# Patient Record
Sex: FEMALE | Race: WHITE | NOT HISPANIC OR LATINO | URBAN - METROPOLITAN AREA
[De-identification: names, ages, dates, MRNs, and addresses within clinical notes are randomized per-mention and may not be internally consistent; named-entity substitution may affect disease eponyms.]

---

## 2017-02-06 ENCOUNTER — EMERGENCY (EMERGENCY)
Facility: HOSPITAL | Age: 82
LOS: 1 days | Discharge: HOME | End: 2017-02-06
Attending: EMERGENCY MEDICINE
Payer: MEDICARE

## 2017-02-06 VITALS
HEART RATE: 70 BPM | WEIGHT: 128.09 LBS | RESPIRATION RATE: 18 BRPM | HEIGHT: 59 IN | OXYGEN SATURATION: 98 % | TEMPERATURE: 98 F | SYSTOLIC BLOOD PRESSURE: 144 MMHG | DIASTOLIC BLOOD PRESSURE: 77 MMHG

## 2017-02-06 DIAGNOSIS — R07.2 PRECORDIAL PAIN: ICD-10-CM

## 2017-02-06 LAB
HCT VFR BLD CALC: 36.7 % — LOW (ref 37–47)
HGB BLD-MCNC: 11.9 G/DL — LOW (ref 12–16)
MCHC RBC-ENTMCNC: 28.8 PG — SIGNIFICANT CHANGE UP (ref 27–31)
MCHC RBC-ENTMCNC: 32.4 G/DL — SIGNIFICANT CHANGE UP (ref 32–36)
MCV RBC AUTO: 88.9 FL — SIGNIFICANT CHANGE UP (ref 81–99)
PLATELET # BLD AUTO: 246 K/UL — SIGNIFICANT CHANGE UP (ref 150–400)
RBC # BLD: 4.13 M/UL — LOW (ref 4.4–5.2)
RBC # FLD: 13.4 % — SIGNIFICANT CHANGE UP (ref 11–15.6)
WBC # BLD: 3.72 K/UL — LOW (ref 4.8–10.8)
WBC # FLD AUTO: 3.72 K/UL — LOW (ref 4.8–10.8)

## 2017-02-06 PROCEDURE — 93010 ELECTROCARDIOGRAM REPORT: CPT

## 2017-02-06 PROCEDURE — 99285 EMERGENCY DEPT VISIT HI MDM: CPT

## 2017-02-06 NOTE — ED ADULT TRIAGE NOTE - CHIEF COMPLAINT QUOTE
intermittent cp started around 9pm  pt id from [facility] hx dementia ao x 1. pt able to state name  states pain is gone at present

## 2017-02-07 VITALS
SYSTOLIC BLOOD PRESSURE: 128 MMHG | DIASTOLIC BLOOD PRESSURE: 74 MMHG | TEMPERATURE: 98 F | OXYGEN SATURATION: 99 % | RESPIRATION RATE: 18 BRPM | HEART RATE: 72 BPM

## 2017-02-07 LAB
ALBUMIN SERPL ELPH-MCNC: 4.3 G/DL — SIGNIFICANT CHANGE UP (ref 3.3–5.2)
ALP SERPL-CCNC: 58 U/L — SIGNIFICANT CHANGE UP (ref 40–120)
ALT FLD-CCNC: 13 U/L — SIGNIFICANT CHANGE UP
ANION GAP SERPL CALC-SCNC: 13 MMOL/L — SIGNIFICANT CHANGE UP (ref 5–17)
APTT BLD: 27.4 SEC — LOW (ref 27.5–37.4)
AST SERPL-CCNC: 16 U/L — SIGNIFICANT CHANGE UP
BILIRUB SERPL-MCNC: 0.2 MG/DL — LOW (ref 0.4–2)
BUN SERPL-MCNC: 19 MG/DL — SIGNIFICANT CHANGE UP (ref 8–20)
CALCIUM SERPL-MCNC: 9.1 MG/DL — SIGNIFICANT CHANGE UP (ref 8.6–10.2)
CHLORIDE SERPL-SCNC: 103 MMOL/L — SIGNIFICANT CHANGE UP (ref 98–107)
CK SERPL-CCNC: 50 U/L — SIGNIFICANT CHANGE UP (ref 25–170)
CO2 SERPL-SCNC: 27 MMOL/L — SIGNIFICANT CHANGE UP (ref 22–29)
CREAT SERPL-MCNC: 0.51 MG/DL — SIGNIFICANT CHANGE UP (ref 0.5–1.3)
GLUCOSE SERPL-MCNC: 108 MG/DL — SIGNIFICANT CHANGE UP (ref 70–115)
INR BLD: 1.13 RATIO — SIGNIFICANT CHANGE UP (ref 0.88–1.16)
POTASSIUM SERPL-MCNC: 3.8 MMOL/L — SIGNIFICANT CHANGE UP (ref 3.5–5.3)
POTASSIUM SERPL-SCNC: 3.8 MMOL/L — SIGNIFICANT CHANGE UP (ref 3.5–5.3)
PROT SERPL-MCNC: 7.2 G/DL — SIGNIFICANT CHANGE UP (ref 6.6–8.7)
PROTHROM AB SERPL-ACNC: 12.4 SEC — SIGNIFICANT CHANGE UP (ref 10–13.1)
SODIUM SERPL-SCNC: 143 MMOL/L — SIGNIFICANT CHANGE UP (ref 135–145)
TROPONIN T SERPL-MCNC: <0.01 NG/ML — SIGNIFICANT CHANGE UP (ref 0–0.06)
TROPONIN T SERPL-MCNC: <0.01 NG/ML — SIGNIFICANT CHANGE UP (ref 0–0.06)

## 2017-02-07 PROCEDURE — 85610 PROTHROMBIN TIME: CPT

## 2017-02-07 PROCEDURE — 80053 COMPREHEN METABOLIC PANEL: CPT

## 2017-02-07 PROCEDURE — 84484 ASSAY OF TROPONIN QUANT: CPT

## 2017-02-07 PROCEDURE — 85730 THROMBOPLASTIN TIME PARTIAL: CPT

## 2017-02-07 PROCEDURE — 82550 ASSAY OF CK (CPK): CPT

## 2017-02-07 PROCEDURE — 93005 ELECTROCARDIOGRAM TRACING: CPT

## 2017-02-07 PROCEDURE — 85027 COMPLETE CBC AUTOMATED: CPT

## 2017-02-07 PROCEDURE — 99284 EMERGENCY DEPT VISIT MOD MDM: CPT | Mod: 25

## 2017-02-07 PROCEDURE — 96374 THER/PROPH/DIAG INJ IV PUSH: CPT

## 2017-02-07 RX ORDER — DIPHENHYDRAMINE HCL 50 MG
25 CAPSULE ORAL ONCE
Qty: 0 | Refills: 0 | Status: COMPLETED | OUTPATIENT
Start: 2017-02-07 | End: 2017-02-07

## 2017-02-07 RX ORDER — ASPIRIN/CALCIUM CARB/MAGNESIUM 324 MG
325 TABLET ORAL ONCE
Qty: 0 | Refills: 0 | Status: COMPLETED | OUTPATIENT
Start: 2017-02-07 | End: 2017-02-07

## 2017-02-07 RX ADMIN — Medication 325 MILLIGRAM(S): at 02:46

## 2017-02-07 RX ADMIN — Medication 25 MILLIGRAM(S): at 04:40

## 2017-02-07 NOTE — ED ADULT NURSE REASSESSMENT NOTE - NS ED NURSE REASSESS COMMENT FT1
Pt asleep and arousable on stretcher, resp even and unlabored, color good, showing NSR on monitor, pt calm and cooperative, denies pain, offers no complaints, awaiting cardio consult and dispo, will continue to monitor

## 2017-02-07 NOTE — CONSULT NOTE ADULT - ASSESSMENT
Brief episode of CP. Now resolved.  Dementia.  Negative troponin x 2.  Normal appearing ECG.  Suggest D/C and out pt f/u as needed.

## 2017-02-07 NOTE — CONSULT NOTE ADULT - SUBJECTIVE AND OBJECTIVE BOX
85F with c/o chest pain for 10 minutes, now can not recall ever having CP.  Hx of dementia per son who is a Doctor.  Denies CP, palpitation, syncope.  Hx of similar complaint in the past with negative w/u.      Vital Signs Last 24 Hrs  T(C): 36.9, Max: 36.9 (02-07 @ 07:47)  T(F): 98.4, Max: 98.4 (02-07 @ 07:47)  HR: 77 (70 - 77)  BP: 160/72 (124/60 - 160/72)  BP(mean): --  RR: 18 (18 - 18)  SpO2: 98% (98% - 98%)    Daily Height in cm: 149.86 (06 Feb 2017 22:58)    Daily     I&O's Detail      PHYSICAL EXAM:  Appearance: Normal, well nourished		  Cardiovascular: Normal S1 S2, No JVD, No cardiac murmurs, No carotid bruits, No peripheral edema  Respiratory: Lungs clear to auscultation	  Gastrointestinal:  Soft, Non-tender, + BS, no bruits	  Skin: No rashes, No ecchymoses, No cyanosis  Neurologic: Grossly non-focal with full strength in all four extremities  Extremities: Normal range of motion, No clubbing, cyanosis or edema  Vascular: Peripheral pulses palpable 2+ bilaterally      INTERPRETATION OF TELEMETRY:    ECG: SR no acute st/t abn    LABS:                        11.9   3.72  )-----------( 246      ( 06 Feb 2017 23:50 )             36.7     06 Feb 2017 23:50    143    |  103    |  19.0   ----------------------------<  108    3.8     |  27.0   |  0.51     Ca    9.1        06 Feb 2017 23:50    TPro  7.2    /  Alb  4.3    /  TBili  0.2    /  DBili  x      /  AST  16     /  ALT  13     /  AlkPhos  58     06 Feb 2017 23:50    CARDIAC MARKERS ( 07 Feb 2017 04:50 )  x     / <0.01 ng/mL / x     / x     / x      CARDIAC MARKERS ( 06 Feb 2017 23:50 )  x     / <0.01 ng/mL / 50 U/L / x     / x          PT/INR - ( 06 Feb 2017 23:50 )   PT: 12.4 sec;   INR: 1.13 ratio         PTT - ( 06 Feb 2017 23:50 )  PTT:27.4 sec    I&O's Summary    BNP  RADIOLOGY & ADDITIONAL STUDIES:

## 2017-02-07 NOTE — ED PROVIDER NOTE - NS ED MD SCRIBE ATTENDING SCRIBE SECTIONS
REVIEW OF SYSTEMS/PHYSICAL EXAM/HISTORY OF PRESENT ILLNESS/PAST MEDICAL/SURGICAL/SOCIAL HISTORY/VITAL SIGNS( Pullset)/DISPOSITION/RESULTS

## 2017-02-07 NOTE — ED ADULT NURSE REASSESSMENT NOTE - NS ED NURSE REASSESS COMMENT FT1
Pt ambulating to bathroom multiple times without difficulty, offers no complaints at this time, denies pain, will continue to monitor

## 2017-02-07 NOTE — ED ADULT NURSE REASSESSMENT NOTE - NS ED NURSE REASSESS COMMENT FT1
Pt restless in bed, pt getting up multiple times, pacing around unit, pt with stable gait at this time, pt reoriented to surroundings, offers no complaints at this time, denies pain, MD Huddleston made aware of restlessness, pt medicated as per MD orders

## 2017-02-07 NOTE — ED ADULT NURSE REASSESSMENT NOTE - NS ED NURSE REASSESS COMMENT FT1
Patient recd this morning A/Ox2-confused at times, VSS, denies chest pain or sob.  Respirations are even and unlabored, lungs cta, +bowel x4 quads, abdomen soft, nontender/nondistended, skin w/d/i. Patient has no complaints at this time, ambulated patient to bathroom-gait steady, patient remains on cardiac monitor, patient in yellow gown for safety concerns, will continue to monitor for safety and comfort.

## 2017-02-07 NOTE — ED PROVIDER NOTE - OBJECTIVE STATEMENT
An 86 year old female pt with a hx of dementia, from [facility], presents to the ED c/o CP. The pt reports experiencing substernal CP that she describes as a pressure around 6 PM. The pt states that the pain lasted approx. 10 minutes, denies any radiation, SOB, fevers, or chills. Pt has not had this pain in the past and denies any cardiac hx. She denies ever receiving a stress test or cardiac catheterization. Pt denies any aggravating or relieving factors and reports that the pain has dissipated. No further complaints at this time.

## 2017-02-07 NOTE — ED PROVIDER NOTE - MEDICAL DECISION MAKING DETAILS
An 86 year old female pt presents to the ED c/o CP> Will check labs, EKG, CXR, and re-evaluate. Will contact cardiology in the AM.

## 2018-06-13 NOTE — ED PROVIDER NOTE - CARDIOVASCULAR [+], MLM
Try over the counter readers with +2.25 power to help with reading.   Possible posterior vitreous detachment (sudden onset large floater and/or flashing lights) discussed.  Use artificial tears up to 4 times daily both eyes.  (Refresh Tears, Systane Ultra/Balance, or Theratears).  Call in February 2019 for an appointment in June 2019 for Complete Exam.    Dr. Stevens [phone]  
CHEST PAIN

## 2018-12-26 NOTE — ED ADULT NURSE NOTE - EXTENSIONS OF SELF_ADULT
Arthritis    Asthma, unspecified asthma severity, unspecified whether complicated, unspecified whether persistent    Bradyarrhythmia    HLD (hyperlipidemia)    HTN (hypertension)    Obese    Vertigo Dentures

## 2019-02-20 ENCOUNTER — EMERGENCY (EMERGENCY)
Facility: HOSPITAL | Age: 84
LOS: 1 days | Discharge: HOME | End: 2019-02-20
Attending: STUDENT IN AN ORGANIZED HEALTH CARE EDUCATION/TRAINING PROGRAM
Payer: MEDICARE

## 2019-02-20 VITALS
HEART RATE: 84 BPM | TEMPERATURE: 98 F | SYSTOLIC BLOOD PRESSURE: 148 MMHG | DIASTOLIC BLOOD PRESSURE: 85 MMHG | OXYGEN SATURATION: 100 % | RESPIRATION RATE: 18 BRPM

## 2019-02-20 VITALS
HEIGHT: 59 IN | DIASTOLIC BLOOD PRESSURE: 62 MMHG | RESPIRATION RATE: 18 BRPM | WEIGHT: 138.01 LBS | HEART RATE: 88 BPM | TEMPERATURE: 99 F | SYSTOLIC BLOOD PRESSURE: 132 MMHG | OXYGEN SATURATION: 96 %

## 2019-02-20 LAB
ALBUMIN SERPL ELPH-MCNC: 4.2 G/DL — SIGNIFICANT CHANGE UP (ref 3.3–5.2)
ALP SERPL-CCNC: 55 U/L — SIGNIFICANT CHANGE UP (ref 40–120)
ALT FLD-CCNC: 9 U/L — SIGNIFICANT CHANGE UP
ANION GAP SERPL CALC-SCNC: 12 MMOL/L — SIGNIFICANT CHANGE UP (ref 5–17)
APPEARANCE UR: CLEAR — SIGNIFICANT CHANGE UP
AST SERPL-CCNC: 15 U/L — SIGNIFICANT CHANGE UP
BASOPHILS # BLD AUTO: 0 K/UL — SIGNIFICANT CHANGE UP (ref 0–0.2)
BASOPHILS NFR BLD AUTO: 0.3 % — SIGNIFICANT CHANGE UP (ref 0–2)
BILIRUB SERPL-MCNC: 0.4 MG/DL — SIGNIFICANT CHANGE UP (ref 0.4–2)
BILIRUB UR-MCNC: NEGATIVE — SIGNIFICANT CHANGE UP
BUN SERPL-MCNC: 19 MG/DL — SIGNIFICANT CHANGE UP (ref 8–20)
CALCIUM SERPL-MCNC: 9.2 MG/DL — SIGNIFICANT CHANGE UP (ref 8.6–10.2)
CHLORIDE SERPL-SCNC: 104 MMOL/L — SIGNIFICANT CHANGE UP (ref 98–107)
CO2 SERPL-SCNC: 27 MMOL/L — SIGNIFICANT CHANGE UP (ref 22–29)
COLOR SPEC: YELLOW — SIGNIFICANT CHANGE UP
CREAT SERPL-MCNC: 0.7 MG/DL — SIGNIFICANT CHANGE UP (ref 0.5–1.3)
DIFF PNL FLD: ABNORMAL
EOSINOPHIL # BLD AUTO: 0.1 K/UL — SIGNIFICANT CHANGE UP (ref 0–0.5)
EOSINOPHIL NFR BLD AUTO: 2.4 % — SIGNIFICANT CHANGE UP (ref 0–6)
FLUAV H3 RNA SPEC QL NAA+PROBE: DETECTED
GLUCOSE SERPL-MCNC: 127 MG/DL — HIGH (ref 70–115)
GLUCOSE UR QL: NEGATIVE MG/DL — SIGNIFICANT CHANGE UP
HCT VFR BLD CALC: 38.3 % — SIGNIFICANT CHANGE UP (ref 37–47)
HGB BLD-MCNC: 12.4 G/DL — SIGNIFICANT CHANGE UP (ref 12–16)
KETONES UR-MCNC: NEGATIVE — SIGNIFICANT CHANGE UP
LACTATE BLDV-MCNC: 0.8 MMOL/L — SIGNIFICANT CHANGE UP (ref 0.5–2)
LEUKOCYTE ESTERASE UR-ACNC: ABNORMAL
LIDOCAIN IGE QN: 38 U/L — SIGNIFICANT CHANGE UP (ref 22–51)
LYMPHOCYTES # BLD AUTO: 0.8 K/UL — LOW (ref 1–4.8)
LYMPHOCYTES # BLD AUTO: 22.5 % — SIGNIFICANT CHANGE UP (ref 20–55)
MCHC RBC-ENTMCNC: 28.7 PG — SIGNIFICANT CHANGE UP (ref 27–31)
MCHC RBC-ENTMCNC: 32.4 G/DL — SIGNIFICANT CHANGE UP (ref 32–36)
MCV RBC AUTO: 88.7 FL — SIGNIFICANT CHANGE UP (ref 81–99)
MONOCYTES # BLD AUTO: 0.5 K/UL — SIGNIFICANT CHANGE UP (ref 0–0.8)
MONOCYTES NFR BLD AUTO: 13.5 % — HIGH (ref 3–10)
NEUTROPHILS # BLD AUTO: 2.3 K/UL — SIGNIFICANT CHANGE UP (ref 1.8–8)
NEUTROPHILS NFR BLD AUTO: 61.3 % — SIGNIFICANT CHANGE UP (ref 37–73)
NITRITE UR-MCNC: NEGATIVE — SIGNIFICANT CHANGE UP
PH UR: 6.5 — SIGNIFICANT CHANGE UP (ref 5–8)
PLATELET # BLD AUTO: 244 K/UL — SIGNIFICANT CHANGE UP (ref 150–400)
POTASSIUM SERPL-MCNC: 3.7 MMOL/L — SIGNIFICANT CHANGE UP (ref 3.5–5.3)
POTASSIUM SERPL-SCNC: 3.7 MMOL/L — SIGNIFICANT CHANGE UP (ref 3.5–5.3)
PROT SERPL-MCNC: 7.1 G/DL — SIGNIFICANT CHANGE UP (ref 6.6–8.7)
PROT UR-MCNC: NEGATIVE MG/DL — SIGNIFICANT CHANGE UP
RAPID RVP RESULT: DETECTED
RBC # BLD: 4.32 M/UL — LOW (ref 4.4–5.2)
RBC # FLD: 13.8 % — SIGNIFICANT CHANGE UP (ref 11–15.6)
SODIUM SERPL-SCNC: 143 MMOL/L — SIGNIFICANT CHANGE UP (ref 135–145)
SP GR SPEC: 1.01 — SIGNIFICANT CHANGE UP (ref 1.01–1.02)
UROBILINOGEN FLD QL: NEGATIVE MG/DL — SIGNIFICANT CHANGE UP
WBC # BLD: 3.8 K/UL — LOW (ref 4.8–10.8)
WBC # FLD AUTO: 3.8 K/UL — LOW (ref 4.8–10.8)

## 2019-02-20 PROCEDURE — 74177 CT ABD & PELVIS W/CONTRAST: CPT | Mod: 26

## 2019-02-20 PROCEDURE — 83690 ASSAY OF LIPASE: CPT

## 2019-02-20 PROCEDURE — 87633 RESP VIRUS 12-25 TARGETS: CPT

## 2019-02-20 PROCEDURE — 74177 CT ABD & PELVIS W/CONTRAST: CPT

## 2019-02-20 PROCEDURE — 71045 X-RAY EXAM CHEST 1 VIEW: CPT | Mod: 26

## 2019-02-20 PROCEDURE — 99284 EMERGENCY DEPT VISIT MOD MDM: CPT | Mod: 25

## 2019-02-20 PROCEDURE — 81001 URINALYSIS AUTO W/SCOPE: CPT

## 2019-02-20 PROCEDURE — 80053 COMPREHEN METABOLIC PANEL: CPT

## 2019-02-20 PROCEDURE — 83605 ASSAY OF LACTIC ACID: CPT

## 2019-02-20 PROCEDURE — 87798 DETECT AGENT NOS DNA AMP: CPT

## 2019-02-20 PROCEDURE — 71045 X-RAY EXAM CHEST 1 VIEW: CPT

## 2019-02-20 PROCEDURE — 87581 M.PNEUMON DNA AMP PROBE: CPT

## 2019-02-20 PROCEDURE — 87486 CHLMYD PNEUM DNA AMP PROBE: CPT

## 2019-02-20 PROCEDURE — 36415 COLL VENOUS BLD VENIPUNCTURE: CPT

## 2019-02-20 PROCEDURE — 85027 COMPLETE CBC AUTOMATED: CPT

## 2019-02-20 PROCEDURE — 99284 EMERGENCY DEPT VISIT MOD MDM: CPT

## 2019-02-20 RX ORDER — CEPHALEXIN 500 MG
1 CAPSULE ORAL
Qty: 20 | Refills: 0 | OUTPATIENT
Start: 2019-02-20 | End: 2019-03-01

## 2019-02-20 RX ORDER — ASPIRIN/CALCIUM CARB/MAGNESIUM 324 MG
1 TABLET ORAL
Qty: 15 | Refills: 0 | OUTPATIENT
Start: 2019-02-20 | End: 2019-03-06

## 2019-02-20 RX ORDER — SODIUM CHLORIDE 9 MG/ML
3 INJECTION INTRAMUSCULAR; INTRAVENOUS; SUBCUTANEOUS ONCE
Qty: 0 | Refills: 0 | Status: COMPLETED | OUTPATIENT
Start: 2019-02-20 | End: 2019-02-20

## 2019-02-20 RX ORDER — ATORVASTATIN CALCIUM 80 MG/1
1 TABLET, FILM COATED ORAL
Qty: 15 | Refills: 0 | OUTPATIENT
Start: 2019-02-20 | End: 2019-03-06

## 2019-02-20 RX ORDER — CEPHALEXIN 500 MG
500 CAPSULE ORAL ONCE
Qty: 0 | Refills: 0 | Status: COMPLETED | OUTPATIENT
Start: 2019-02-20 | End: 2019-02-20

## 2019-02-20 RX ORDER — SODIUM CHLORIDE 9 MG/ML
500 INJECTION INTRAMUSCULAR; INTRAVENOUS; SUBCUTANEOUS ONCE
Qty: 0 | Refills: 0 | Status: COMPLETED | OUTPATIENT
Start: 2019-02-20 | End: 2019-02-20

## 2019-02-20 RX ADMIN — SODIUM CHLORIDE 500 MILLILITER(S): 9 INJECTION INTRAMUSCULAR; INTRAVENOUS; SUBCUTANEOUS at 13:32

## 2019-02-20 RX ADMIN — SODIUM CHLORIDE 3 MILLILITER(S): 9 INJECTION INTRAMUSCULAR; INTRAVENOUS; SUBCUTANEOUS at 13:26

## 2019-02-20 RX ADMIN — Medication 500 MILLIGRAM(S): at 22:04

## 2019-02-20 RX ADMIN — Medication 75 MILLIGRAM(S): at 22:05

## 2019-02-20 NOTE — ED PROVIDER NOTE - PHYSICAL EXAMINATION
Constitutional - well-developed; well nourished. Head - NCAT. Airway patent. Eyes - PERRL. CV - RRR. no murmur. no edema. Pulm - CTAB. Abd - soft, nt. no rebound. no guarding. Neuro - A&Ox2. strength 5/5 x4. sensation intact x4. normal gait. Skin - No rash. MSK - normal ROM.

## 2019-02-20 NOTE — ED ADULT NURSE REASSESSMENT NOTE - NS ED NURSE REASSESS COMMENT FT1
Pt becoming increasingly disoriented. Pt assisted to bathroom and back to bed multiple times. Pt reoriented continuously. Pt assisted back to bed, educated on need to wait for CT scan to be preformed, pt currently in stretcher with siderails up. Pt given call bell, placed in pt hand, and educated to use call bell for needs.

## 2019-02-20 NOTE — ED ADULT TRIAGE NOTE - CHIEF COMPLAINT QUOTE
Pt sent in from the [facility] for abdominal pain, cough and lethargy for the last couple of days. Pt h/o dementia, not a great historian.

## 2019-02-20 NOTE — ED ADULT NURSE NOTE - ED STAT RN HANDOFF DETAILS
report given to ems, pt stable for transport to facility, baseline ms calm and cooperative @ this time

## 2019-02-20 NOTE — ED ADULT NURSE REASSESSMENT NOTE - NS ED NURSE REASSESS COMMENT FT1
Report received from offgoing RN, chart as noted, pt a&ox2 ambulating in ED with steady gait noted with 1:1 LYDIA Gomez in place. Pt reoriented to situation, pending RVP results per MD. No cough noted, denies pain denies n/v/d. Safety maintained, appears in no apparent distress @ this time

## 2019-02-20 NOTE — ED ADULT NURSE REASSESSMENT NOTE - NS ED NURSE REASSESS COMMENT FT1
pt ambulating around unit with [facility] amalia at her bedside. pt awaiting ct results at this time. pt educated on plan of care, pt able to successfully teach back plan of care to RN, RN will continue to reeducate pt during hospital stay.

## 2019-02-20 NOTE — ED PROVIDER NOTE - CLINICAL SUMMARY MEDICAL DECISION MAKING FREE TEXT BOX
Pt with cold symptoms for the past 48 h now complaining of abd pain.  will check labs, ct and reassess.

## 2019-02-20 NOTE — ED ADULT NURSE NOTE - OBJECTIVE STATEMENT
Assumed pt care 1325. Pt lying in bed at this time, aide at bedside, pt in no acute distress. Pt A+Ox2 pt disoriented to time. Pt states she has been feeling "sick" for the last few days describing cough, achiness. Pt states she also began having abdominal pain, described as generalized abdominal pain, nontender to palpation, nondistended, bowel sounds present all four quadrants. Pt lung sounds clear b/l, skin warm dry intact. Pt educated on plan of care, able to successfully teach back plan of care to RN. RN will continue to reeducate pt during hospital stay.

## 2019-02-20 NOTE — ED ADULT NURSE REASSESSMENT NOTE - NS ED NURSE REASSESS COMMENT FT1
Pt placed in yellow gown. Pt increasingly confused and insist ant on walking around ER. Pt aide left bedside, 1:1 ordered, 1:1 now in place. Pt given call bell and tv remote, pt safety maintained, pt now sitting comfortably in B7R. Pt placed in yellow gown. Pt increasingly confused and insist ant on walking around ER. Pt aide left bedside, 1:1 ordered, 1:1 now in place. Pt given call bell and tv remote, pt safety maintained, pt now sitting comfortably in B7R. Pt educated that we are awaiting results of RVP.

## 2019-02-20 NOTE — ED PROVIDER NOTE - PROGRESS NOTE DETAILS
Pt with dementia to get CT scan to for abdominal pain. Pt unable to consent for IV contrast.  IV contrast medically necessary for this patient so will forego consent at this time. Pt labs and imaging reviewed. Vascular recommending statin and baby ASA. [facility] requesting influenza swab before d/c. Pt signed out to DR. Worthington. SPoke with [facility] rep - gave results of flu+ and uti, also told of recs from vascular, SW arranging transportation back to [facility]

## 2019-02-20 NOTE — ED PROVIDER NOTE - OBJECTIVE STATEMENT
Pt is an 87 yo F co abdominal pain.  PMHx significant for dementia. Pt was sent from assisted living as she has been complaining of abdominal pain today.  EMS also reports NH stated patient has had cold/cough symptoms over the past several days which has been going around the assisted living. Pt admits to abdominal pain but denies any other complaints. Hx limited 2/2 dementia.

## 2019-02-20 NOTE — CONSULT NOTE ADULT - SUBJECTIVE AND OBJECTIVE BOX
VASCULAR SURGERY CONSULT    HPI: 88y Female hx Alzheimers dementia sent to ED by [facility] for malaise, aches and pains, cough and flu quarantine in the facility and no family to pick her up. Per nursing home no issues eating, eating full meals with no abdominal pain or food fear, and no weight loss. Normal BM no diarrhea or bloody BM. Patient denies fevers/chills, denies lightheadedness/dizziness, denies SOB/chest pain, denies nausea/vomiting, denies constipation/diarrhea.     ROS:   General: denies fatigue, unintended weight loss or night sweats  Neuro: denies focal weakness, numbness or tingling  CV: denies chest pain, chest pressure or palpitations  Resp: denies shortness of breath, pleuritic pain, cough, or wheezing  GI: denies changes in bowel movement, melena or BRBPR  : denies dysuria, hematuria, urinary frequency or urinary urgency  MSK: denies myalgias      PAST MEDICAL & SURGICAL HISTORY:  Alzheimers dementia  HTN  HLD  glaucoma  s/p hysterectomy    FAMILY HISTORY:    [x] Family history not pertinent as reviewed with the patient and family    SOCIAL HISTORY:  lives at nursing home. Had 3 children. No tobacco history    ALLERGIES: NKA No Known Allergies      HOME MEDICATIONS: reviewed      --------------------------------------------------------------------------------------------    PHYSICAL EXAM:  General: NAD, Lying in bed comfortably  Neuro: A+Ox3  HEENT: NC/AT, EOMI  Neck: Soft, supple  Cardio: RRR, nml S1/S2  Resp: Good effort, CTA b/l  GI/Abd: Soft, NT/ND, no rebound/guarding, no masses palpated  Vascular: All 4 extremities warm.  Skin: Intact, no breakdown  Lymphatic/Nodes: No palpable lymphadenopathy  Pelvis: stable  Musculoskeletal: All 4 extremities moving spontaneously, no limitations, no spinal tenderness or stepoffs  --------------------------------------------------------------------------------------------    LABS                 12.4   3.8    )----------(  244       ( 2019 13:38 )               38.3      143    |  104    |  19.0   ----------------------------<  127        ( 2019 13:38 )  3.7     |  27.0   |  0.70     Ca    9.2        ( 2019 13:38 )    TPro  7.1    /  Alb  4.2    /  TBili  0.4    /  DBili  x      /  AST  15     /  ALT  9      /  AlkPhos  55     ( 2019 13:38 )    LIVER FUNCTIONS - ( 2019 13:38 )  Alb: 4.2 g/dL / Pro: 7.1 g/dL / ALK PHOS: 55 U/L / ALT: 9 U/L / AST: 15 U/L / GGT: x             Urinalysis Basic - ( 2019 13:59 )    Color: Yellow / Appearance: Clear / S.010 / pH: x  Gluc: x / Ketone: Negative  / Bili: Negative / Urobili: Negative mg/dL   Blood: x / Protein: Negative mg/dL / Nitrite: Negative   Leuk Esterase: Moderate / RBC: 3-5 /HPF / WBC 3-5   Sq Epi: x / Non Sq Epi: Occasional / Bacteria: Occasional        17:53 - VBG - pH:       | pCO2:       | pO2:       | Lactate: 0.8      --------------------------------------------------------------------------------------------  IMAGING  CT A/P: There is 70 % narrowing of the superior mesenteric artery origin. Could   this patient experienced postprandial abdominal pain secondary to SMA   origin stenosis. There is degenerative spondylosis with multilevel disc   space narrowing and marginal osteophytes involving the lower thoracic and   lumbar vertebra.    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 De La Vega  - Plan discussed with Attending, Dr. De La Vega

## 2019-02-20 NOTE — ED ADULT NURSE REASSESSMENT NOTE - NS ED NURSE REASSESS COMMENT FT1
Pt assisted to bathroom, ambulated with steady gait.  Pt assisted back into bed. Pt given call bell, educated to use it for assistance.

## 2021-06-10 NOTE — ED ADULT NURSE NOTE - IN THE PAST 12 MONTHS HAVE YOU USED DRUGS OTHER THAN THOSE REQUIRED FOR MEDICAL REASON?
Debridement Text: The wound edges were debrided prior to proceeding with the closure to facilitate wound healing. No

## 2021-11-03 NOTE — ED ADULT NURSE NOTE - HOW OFTEN DO YOU HAVE A DRINK CONTAINING ALCOHOL?
Emergency Physician Note        Note Open Time: 3:29 AM EDT    Chief Complaint  Ankle Pain (Pt reports left ankle pain, tingling, numbness for last \"4 days\". Pt states that she had \"cast placed 4 days ago\". Pt alert and oriented and rates pain as a 7/10.)       History of Present Illness  Chely Bergeron is a 25 y.o. female who presents to the ED for ankle pain. Patient reports that she has had a longstanding issue with her left ankle and left lower extremity being treated by her orthopedist and 4 days ago they placed her in a cast to help with tendinitis. The cast however was formed with the toe pointed about 40 degrees from horizontal.  She states she is having significant pain in the ankle and wants to have the cast removed and be given a boot as well as referral to a new orthopedist.  She denies any other complaints. 10 systems reviewed, pertinent positives per HPI otherwise noted to be negative    I have reviewed the following from the nursing documentation:      Prior to Admission medications    Medication Sig Start Date End Date Taking? Authorizing Provider   meloxicam (MOBIC) 15 MG tablet Take 15 mg by mouth daily   Yes Historical Provider, MD   Riboflavin (B2) 100 MG TABS Take by mouth   Yes Historical Provider, MD   Ascorbic Acid (VITAMIN C) 250 MG tablet Take 250 mg by mouth daily   Yes Historical Provider, MD   SUMAtriptan (IMITREX) 25 MG tablet Take 25 mg by mouth once as needed for Migraine   Yes Historical Provider, MD   Diclofenac Sodium-Capsaicin 75 & 0.025 MG-% THPK by Combination route   Yes Historical Provider, MD   fluticasone (FLONASE) 50 MCG/ACT nasal spray 1 spray by Each Nare route daily   Yes Historical Provider, MD   gabapentin (NEURONTIN) 300 MG capsule Start with one cap once daily for 2 days, then one cap twice daily for 2 days.  Caution: may cause drowsiness 9/26/21 10/3/21  Sami Steen PA-C   metoprolol tartrate (LOPRESSOR) 25 MG tablet Take 1 tablet by mouth 2 times daily 12/12/18   Iker Burgos MD   cloNIDine (CATAPRES) 0.1 MG tablet Take 2-4 tablets by mouth nightly as needed for High Blood Pressure 12/12/18   Iker Burgos MD   ondansetron [facility]) 4 MG tablet Take 1 tablet by mouth every 8 hours as needed for Nausea or Vomiting 12/5/18   Bravo Rosario MD   sulindac (CLINORIL) 150 MG tablet Take 1 tablet by mouth 2 times daily for 20 doses Do not take with ibuprofen or other NSAIDs or anti-inflammatories 9/22/18 10/2/18  SANDRA Casey   levETIRAcetam (KEPPRA) 250 MG tablet Take 250 mg by mouth 2 times daily    Historical Provider, MD   vitamin B-6 (PYRIDOXINE) 50 MG tablet Take 50 mg by mouth daily    Historical Provider, MD   Polyethyl Glycol-Propyl Glycol (POLYETHYL GLYC-PROPYL GLYC PF OP) Apply to eye    Historical Provider, MD   Tretinoin (RETIN-A EX) Apply topically    Historical Provider, MD       Allergies as of 11/03/2021 - Fully Reviewed 11/03/2021   Allergen Reaction Noted    Compazine [prochlorperazine maleate]  12/01/2016    Reglan [metoclopramide]  09/22/2018       Past Medical History:   Diagnosis Date    Adam-Danlos syndrome     Headache     Scoliosis         Surgical History: History reviewed. No pertinent surgical history. Family History:  History reviewed. No pertinent family history.     Social History     Socioeconomic History    Marital status: Single     Spouse name: Not on file    Number of children: Not on file    Years of education: Not on file    Highest education level: Not on file   Occupational History    Not on file   Tobacco Use    Smoking status: Never Smoker    Smokeless tobacco: Never Used   Vaping Use    Vaping Use: Never used   Substance and Sexual Activity    Alcohol use: Yes     Comment: rarely    Drug use: Yes     Types: Marijuana [employer])     Comment: occassionally    Sexual activity: Yes     Partners: Female   Other Topics Concern    Not on file   Social History Narrative    Not on file     Social Determinants of Health     Financial Resource Strain:     Difficulty of Paying Living Expenses:    Food Insecurity:     Worried About Running Out of Food in the Last Year:     [address] in the Last Year:    Transportation Needs:     Lack of Transportation (Medical):  Lack of Transportation (Non-Medical):    Physical Activity:     Days of Exercise per Week:     Minutes of Exercise per Session:    Stress:     Feeling of Stress :    Social Connections:     Frequency of Communication with Friends and Family:     Frequency of Social Gatherings with Friends and Family:     Attends Zoroastrianism Services:     Active Member of Clubs or Organizations:     Attends Club or Organization Meetings:     Marital Status:    Intimate Partner Violence:     Fear of Current or Ex-Partner:     Emotionally Abused:     Physically Abused:     Sexually Abused:        Nursing notes reviewed. ED Triage Vitals [11/03/21 0206]   Enc Vitals Group      BP (!) 160/89      Pulse 120      Resp 17      Temp 98.1 °F (36.7 °C)      Temp Source Oral      SpO2 100 %      Weight 134 lb 7.7 oz (61 kg)      Height       Head Circumference       Peak Flow       Pain Score       Pain Loc       Pain Edu? Excl. in [address]? GENERAL:  Awake, alert. Well developed, well nourished with no apparent distress. HENT:  Normocephalic, Atraumatic, moist mucous membranes. EXTREMITIES:  Normal range of motion, no edema, no bony tenderness, no deformity, distal pulses present. Left ankle in a black fiberglass cast plantarflexed about 30 to 40 degrees. No swelling or warmth or erythema. SKIN: Warm, dry and intact. NEUROLOGIC: Normal mental status. Moving all extremities to command. MEDICAL DECISION MAKING   I removed the patient's cast using cast saw. We then placed the patient in boot. She is much more comfortable.   I am referring her to a new orthopedist per her request.     I advised the patient to return to the emergency department immediately for any new or worsening symptoms, such as increased pain or swelling. The patient voiced agreement and understanding of the treatment plan. No results found for this visit on 11/03/21. I estimate there is LOW risk for COMPARTMENT SYNDROME, DEEP VENOUS THROMBOSIS, SEPTIC ARTHRITIS, TENDON OR NEUROVASCULAR INJURY, thus I consider the discharge disposition reasonable. Ruben Caruso and I have discussed the diagnosis and risks, and we agree with discharging home to follow-up with their primary doctor or the referral orthopedist. We also discussed returning to the Emergency Department immediately if new or worsening symptoms occur. We have discussed the symptoms which are most concerning (e.g., changing or worsening pain, numbness, weakness) that necessitate immediate return. Final Impression    1. Chronic pain of left ankle        Blood pressure 114/63, pulse 85, temperature 98.1 °F (36.7 °C), temperature source Temporal, resp. rate 16, weight 134 lb 7.7 oz (61 kg), last menstrual period 10/01/2021, SpO2 99 %. Patient was given scripts for the following medications. I counseled patient how to take these medications. New Prescriptions    No medications on file       Disposition  Pt is in good condition upon Discharge to home. This chart was generated using the [address] [place] [address] dictation system. I created this record but it may contain dictation errors.           Jamaal Burgess MD  11/03/21 0039 Never

## 2023-06-23 NOTE — ED PROVIDER NOTE - CPE EDP ENMT NORM
normal... no verbal instruction/written material/individual instruction/pictorial/teach back - (Patient repeats in own words)/patient instructed

## 2025-01-01 ENCOUNTER — INPATIENT (INPATIENT)
Facility: HOSPITAL | Age: 89
LOS: 0 days | DRG: 872 | End: 2025-02-18
Attending: INTERNAL MEDICINE | Admitting: INTERNAL MEDICINE
Payer: MEDICARE

## 2025-01-01 VITALS
OXYGEN SATURATION: 98 % | SYSTOLIC BLOOD PRESSURE: 107 MMHG | RESPIRATION RATE: 30 BRPM | DIASTOLIC BLOOD PRESSURE: 62 MMHG | WEIGHT: 115.08 LBS | HEART RATE: 161 BPM

## 2025-01-01 VITALS — OXYGEN SATURATION: 97 %

## 2025-01-01 DIAGNOSIS — J96.01 ACUTE RESPIRATORY FAILURE WITH HYPOXIA: ICD-10-CM

## 2025-01-01 DIAGNOSIS — I77.9 DISORDER OF ARTERIES AND ARTERIOLES, UNSPECIFIED: ICD-10-CM

## 2025-01-01 DIAGNOSIS — Z29.9 ENCOUNTER FOR PROPHYLACTIC MEASURES, UNSPECIFIED: ICD-10-CM

## 2025-01-01 DIAGNOSIS — J69.0 PNEUMONITIS DUE TO INHALATION OF FOOD AND VOMIT: ICD-10-CM

## 2025-01-01 DIAGNOSIS — R79.89 OTHER SPECIFIED ABNORMAL FINDINGS OF BLOOD CHEMISTRY: ICD-10-CM

## 2025-01-01 DIAGNOSIS — I96 GANGRENE, NOT ELSEWHERE CLASSIFIED: ICD-10-CM

## 2025-01-01 DIAGNOSIS — G30.9 ALZHEIMER'S DISEASE, UNSPECIFIED: ICD-10-CM

## 2025-01-01 DIAGNOSIS — N17.9 ACUTE KIDNEY FAILURE, UNSPECIFIED: ICD-10-CM

## 2025-01-01 DIAGNOSIS — A41.9 SEPSIS, UNSPECIFIED ORGANISM: ICD-10-CM

## 2025-01-01 DIAGNOSIS — G93.41 METABOLIC ENCEPHALOPATHY: ICD-10-CM

## 2025-01-01 DIAGNOSIS — F03.C0 UNSPECIFIED DEMENTIA, SEVERE, WITHOUT BEHAVIORAL DISTURBANCE, PSYCHOTIC DISTURBANCE, MOOD DISTURBANCE, AND ANXIETY: ICD-10-CM

## 2025-01-01 DIAGNOSIS — I10 ESSENTIAL (PRIMARY) HYPERTENSION: ICD-10-CM

## 2025-01-01 LAB
ALBUMIN SERPL ELPH-MCNC: 2.3 G/DL — LOW (ref 3.3–5)
ALP SERPL-CCNC: 81 U/L — SIGNIFICANT CHANGE UP (ref 40–120)
ALT FLD-CCNC: 52 U/L — SIGNIFICANT CHANGE UP (ref 12–78)
ANION GAP SERPL CALC-SCNC: 11 MMOL/L — SIGNIFICANT CHANGE UP (ref 5–17)
ANION GAP SERPL CALC-SCNC: 8 MMOL/L — SIGNIFICANT CHANGE UP (ref 5–17)
APTT BLD: 26.2 SEC — SIGNIFICANT CHANGE UP (ref 24.5–35.6)
AST SERPL-CCNC: 73 U/L — HIGH (ref 15–37)
BASOPHILS # BLD AUTO: 0.05 K/UL — SIGNIFICANT CHANGE UP (ref 0–0.2)
BASOPHILS NFR BLD AUTO: 0.3 % — SIGNIFICANT CHANGE UP (ref 0–2)
BILIRUB SERPL-MCNC: 0.9 MG/DL — SIGNIFICANT CHANGE UP (ref 0.2–1.2)
BUN SERPL-MCNC: 54 MG/DL — HIGH (ref 7–23)
BUN SERPL-MCNC: 69 MG/DL — HIGH (ref 7–23)
CALCIUM SERPL-MCNC: 8.4 MG/DL — LOW (ref 8.5–10.1)
CALCIUM SERPL-MCNC: 9.1 MG/DL — SIGNIFICANT CHANGE UP (ref 8.5–10.1)
CHLORIDE SERPL-SCNC: 115 MMOL/L — HIGH (ref 96–108)
CHLORIDE SERPL-SCNC: 117 MMOL/L — HIGH (ref 96–108)
CO2 SERPL-SCNC: 22 MMOL/L — SIGNIFICANT CHANGE UP (ref 22–31)
CO2 SERPL-SCNC: 23 MMOL/L — SIGNIFICANT CHANGE UP (ref 22–31)
CREAT SERPL-MCNC: 2 MG/DL — HIGH (ref 0.5–1.3)
CREAT SERPL-MCNC: 3 MG/DL — HIGH (ref 0.5–1.3)
EGFR: 14 ML/MIN/1.73M2 — LOW
EGFR: 23 ML/MIN/1.73M2 — LOW
EOSINOPHIL # BLD AUTO: 0.03 K/UL — SIGNIFICANT CHANGE UP (ref 0–0.5)
EOSINOPHIL NFR BLD AUTO: 0.2 % — SIGNIFICANT CHANGE UP (ref 0–6)
GLUCOSE SERPL-MCNC: 290 MG/DL — HIGH (ref 70–99)
GLUCOSE SERPL-MCNC: 300 MG/DL — HIGH (ref 70–99)
GRAM STN FLD: ABNORMAL
HCT VFR BLD CALC: 40.1 % — SIGNIFICANT CHANGE UP (ref 34.5–45)
HCT VFR BLD CALC: 44.9 % — SIGNIFICANT CHANGE UP (ref 34.5–45)
HGB BLD-MCNC: 12 G/DL — SIGNIFICANT CHANGE UP (ref 11.5–15.5)
HGB BLD-MCNC: 13.7 G/DL — SIGNIFICANT CHANGE UP (ref 11.5–15.5)
IMM GRANULOCYTES NFR BLD AUTO: 1.1 % — HIGH (ref 0–0.9)
INR BLD: 1.82 RATIO — HIGH (ref 0.85–1.16)
LACTATE SERPL-SCNC: 3.8 MMOL/L — HIGH (ref 0.7–2)
LACTATE SERPL-SCNC: 4 MMOL/L — CRITICAL HIGH (ref 0.7–2)
LACTATE SERPL-SCNC: 7.5 MMOL/L — CRITICAL HIGH (ref 0.7–2)
LYMPHOCYTES # BLD AUTO: 0.74 K/UL — LOW (ref 1–3.3)
LYMPHOCYTES # BLD AUTO: 4.7 % — LOW (ref 13–44)
MCHC RBC-ENTMCNC: 27.6 PG — SIGNIFICANT CHANGE UP (ref 27–34)
MCHC RBC-ENTMCNC: 28 PG — SIGNIFICANT CHANGE UP (ref 27–34)
MCHC RBC-ENTMCNC: 29.9 G/DL — LOW (ref 32–36)
MCHC RBC-ENTMCNC: 30.5 G/DL — LOW (ref 32–36)
MCV RBC AUTO: 91.8 FL — SIGNIFICANT CHANGE UP (ref 80–100)
MCV RBC AUTO: 92.4 FL — SIGNIFICANT CHANGE UP (ref 80–100)
METHOD TYPE: SIGNIFICANT CHANGE UP
MONOCYTES # BLD AUTO: 0.62 K/UL — SIGNIFICANT CHANGE UP (ref 0–0.9)
MONOCYTES NFR BLD AUTO: 4 % — SIGNIFICANT CHANGE UP (ref 2–14)
MRSA SPEC QL CULT: SIGNIFICANT CHANGE UP
NEUTROPHILS # BLD AUTO: 14.05 K/UL — HIGH (ref 1.8–7.4)
NEUTROPHILS NFR BLD AUTO: 89.7 % — HIGH (ref 43–77)
NRBC BLD AUTO-RTO: 0 /100 WBCS — SIGNIFICANT CHANGE UP (ref 0–0)
NRBC BLD AUTO-RTO: 0 /100 WBCS — SIGNIFICANT CHANGE UP (ref 0–0)
NT-PROBNP SERPL-SCNC: 2809 PG/ML — HIGH (ref 0–450)
PLATELET # BLD AUTO: 376 K/UL — SIGNIFICANT CHANGE UP (ref 150–400)
PLATELET # BLD AUTO: 615 K/UL — HIGH (ref 150–400)
POTASSIUM SERPL-MCNC: 5.3 MMOL/L — SIGNIFICANT CHANGE UP (ref 3.5–5.3)
POTASSIUM SERPL-MCNC: 5.6 MMOL/L — HIGH (ref 3.5–5.3)
POTASSIUM SERPL-SCNC: 5.3 MMOL/L — SIGNIFICANT CHANGE UP (ref 3.5–5.3)
POTASSIUM SERPL-SCNC: 5.6 MMOL/L — HIGH (ref 3.5–5.3)
PROT SERPL-MCNC: 6.9 G/DL — SIGNIFICANT CHANGE UP (ref 6–8.3)
PROTHROM AB SERPL-ACNC: 21.2 SEC — HIGH (ref 9.9–13.4)
RAPID RVP RESULT: SIGNIFICANT CHANGE UP
RBC # BLD: 4.34 M/UL — SIGNIFICANT CHANGE UP (ref 3.8–5.2)
RBC # BLD: 4.89 M/UL — SIGNIFICANT CHANGE UP (ref 3.8–5.2)
RBC # FLD: 15.1 % — HIGH (ref 10.3–14.5)
RBC # FLD: 15.2 % — HIGH (ref 10.3–14.5)
SARS-COV-2 RNA SPEC QL NAA+PROBE: SIGNIFICANT CHANGE UP
SODIUM SERPL-SCNC: 145 MMOL/L — SIGNIFICANT CHANGE UP (ref 135–145)
SODIUM SERPL-SCNC: 151 MMOL/L — HIGH (ref 135–145)
SPECIMEN SOURCE: SIGNIFICANT CHANGE UP
TROPONIN I, HIGH SENSITIVITY RESULT: 300.1 NG/L — HIGH
WBC # BLD: 15.67 K/UL — HIGH (ref 3.8–10.5)
WBC # BLD: 19.05 K/UL — HIGH (ref 3.8–10.5)
WBC # FLD AUTO: 15.67 K/UL — HIGH (ref 3.8–10.5)
WBC # FLD AUTO: 19.05 K/UL — HIGH (ref 3.8–10.5)

## 2025-01-01 PROCEDURE — 99285 EMERGENCY DEPT VISIT HI MDM: CPT | Mod: 25

## 2025-01-01 PROCEDURE — 99291 CRITICAL CARE FIRST HOUR: CPT

## 2025-01-01 PROCEDURE — 93005 ELECTROCARDIOGRAM TRACING: CPT

## 2025-01-01 PROCEDURE — 71045 X-RAY EXAM CHEST 1 VIEW: CPT

## 2025-01-01 PROCEDURE — 85610 PROTHROMBIN TIME: CPT

## 2025-01-01 PROCEDURE — 87077 CULTURE AEROBIC IDENTIFY: CPT

## 2025-01-01 PROCEDURE — 71045 X-RAY EXAM CHEST 1 VIEW: CPT | Mod: 26

## 2025-01-01 PROCEDURE — 93010 ELECTROCARDIOGRAM REPORT: CPT

## 2025-01-01 PROCEDURE — 96374 THER/PROPH/DIAG INJ IV PUSH: CPT

## 2025-01-01 PROCEDURE — 85730 THROMBOPLASTIN TIME PARTIAL: CPT

## 2025-01-01 PROCEDURE — 87040 BLOOD CULTURE FOR BACTERIA: CPT

## 2025-01-01 PROCEDURE — 84484 ASSAY OF TROPONIN QUANT: CPT

## 2025-01-01 PROCEDURE — 80048 BASIC METABOLIC PNL TOTAL CA: CPT

## 2025-01-01 PROCEDURE — 82962 GLUCOSE BLOOD TEST: CPT

## 2025-01-01 PROCEDURE — 0225U NFCT DS DNA&RNA 21 SARSCOV2: CPT

## 2025-01-01 PROCEDURE — 96375 TX/PRO/DX INJ NEW DRUG ADDON: CPT

## 2025-01-01 PROCEDURE — 73630 X-RAY EXAM OF FOOT: CPT | Mod: 26,LT

## 2025-01-01 PROCEDURE — 83880 ASSAY OF NATRIURETIC PEPTIDE: CPT

## 2025-01-01 PROCEDURE — 83605 ASSAY OF LACTIC ACID: CPT

## 2025-01-01 PROCEDURE — 87150 DNA/RNA AMPLIFIED PROBE: CPT

## 2025-01-01 PROCEDURE — 87186 SC STD MICRODIL/AGAR DIL: CPT

## 2025-01-01 PROCEDURE — 36415 COLL VENOUS BLD VENIPUNCTURE: CPT

## 2025-01-01 PROCEDURE — 85027 COMPLETE CBC AUTOMATED: CPT

## 2025-01-01 PROCEDURE — 80053 COMPREHEN METABOLIC PANEL: CPT

## 2025-01-01 PROCEDURE — 99223 1ST HOSP IP/OBS HIGH 75: CPT

## 2025-01-01 PROCEDURE — 73630 X-RAY EXAM OF FOOT: CPT

## 2025-01-01 PROCEDURE — 85025 COMPLETE CBC W/AUTO DIFF WBC: CPT

## 2025-01-01 RX ORDER — ACETAMINOPHEN 500 MG/5ML
650 LIQUID (ML) ORAL EVERY 6 HOURS
Refills: 0 | Status: DISCONTINUED | OUTPATIENT
Start: 2025-01-01 | End: 2025-01-01

## 2025-01-01 RX ORDER — SODIUM CHLORIDE 9 G/1000ML
1000 INJECTION, SOLUTION INTRAVENOUS
Refills: 0 | Status: DISCONTINUED | OUTPATIENT
Start: 2025-01-01 | End: 2025-01-01

## 2025-01-01 RX ORDER — ASPIRIN 325 MG
300 TABLET ORAL DAILY
Refills: 0 | Status: DISCONTINUED | OUTPATIENT
Start: 2025-01-01 | End: 2025-01-01

## 2025-01-01 RX ORDER — DEXTROSE 50 % IN WATER 50 %
15 SYRINGE (ML) INTRAVENOUS ONCE
Refills: 0 | Status: DISCONTINUED | OUTPATIENT
Start: 2025-01-01 | End: 2025-01-01

## 2025-01-01 RX ORDER — PIPERACILLIN-TAZO-DEXTROSE,ISO 3.375G/5
3.38 IV SOLUTION, PIGGYBACK PREMIX FROZEN(ML) INTRAVENOUS EVERY 8 HOURS
Refills: 0 | Status: DISCONTINUED | OUTPATIENT
Start: 2025-01-01 | End: 2025-01-01

## 2025-01-01 RX ORDER — ACETAMINOPHEN 500 MG/5ML
1000 LIQUID (ML) ORAL ONCE
Refills: 0 | Status: COMPLETED | OUTPATIENT
Start: 2025-01-01 | End: 2025-01-01

## 2025-01-01 RX ORDER — SODIUM POLYSTYRENE SULFONATE 15 G/60ML
15 SUSPENSION ORAL; RECTAL ONCE
Refills: 0 | Status: COMPLETED | OUTPATIENT
Start: 2025-01-01 | End: 2025-01-01

## 2025-01-01 RX ORDER — SODIUM ZIRCONIUM CYCLOSILICATE 5 G/5G
5 POWDER, FOR SUSPENSION ORAL ONCE
Refills: 0 | Status: COMPLETED | OUTPATIENT
Start: 2025-01-01 | End: 2025-01-01

## 2025-01-01 RX ORDER — GLUCAGON 3 MG/1
1 POWDER NASAL ONCE
Refills: 0 | Status: DISCONTINUED | OUTPATIENT
Start: 2025-01-01 | End: 2025-01-01

## 2025-01-01 RX ORDER — DEXTROSE 50 % IN WATER 50 %
25 SYRINGE (ML) INTRAVENOUS ONCE
Refills: 0 | Status: DISCONTINUED | OUTPATIENT
Start: 2025-01-01 | End: 2025-01-01

## 2025-01-01 RX ORDER — SODIUM CHLORIDE 9 G/1000ML
500 INJECTION, SOLUTION INTRAVENOUS ONCE
Refills: 0 | Status: COMPLETED | OUTPATIENT
Start: 2025-01-01 | End: 2025-01-01

## 2025-01-01 RX ORDER — DEXTROSE 50 % IN WATER 50 %
12.5 SYRINGE (ML) INTRAVENOUS ONCE
Refills: 0 | Status: DISCONTINUED | OUTPATIENT
Start: 2025-01-01 | End: 2025-01-01

## 2025-01-01 RX ORDER — PIPERACILLIN-TAZO-DEXTROSE,ISO 3.375G/5
3.38 IV SOLUTION, PIGGYBACK PREMIX FROZEN(ML) INTRAVENOUS ONCE
Refills: 0 | Status: COMPLETED | OUTPATIENT
Start: 2025-01-01 | End: 2025-01-01

## 2025-01-01 RX ORDER — BISACODYL 5 MG
10 TABLET, DELAYED RELEASE (ENTERIC COATED) ORAL DAILY
Refills: 0 | Status: DISCONTINUED | OUTPATIENT
Start: 2025-01-01 | End: 2025-01-01

## 2025-01-01 RX ORDER — VANCOMYCIN HCL IN 5 % DEXTROSE 1.5G/250ML
1000 PLASTIC BAG, INJECTION (ML) INTRAVENOUS ONCE
Refills: 0 | Status: COMPLETED | OUTPATIENT
Start: 2025-01-01 | End: 2025-01-01

## 2025-01-01 RX ORDER — INSULIN LISPRO 100 U/ML
INJECTION, SOLUTION INTRAVENOUS; SUBCUTANEOUS EVERY 6 HOURS
Refills: 0 | Status: DISCONTINUED | OUTPATIENT
Start: 2025-01-01 | End: 2025-01-01

## 2025-01-01 RX ORDER — HEPARIN SODIUM 1000 [USP'U]/ML
5000 INJECTION INTRAVENOUS; SUBCUTANEOUS EVERY 12 HOURS
Refills: 0 | Status: DISCONTINUED | OUTPATIENT
Start: 2025-01-01 | End: 2025-01-01

## 2025-01-01 RX ORDER — SCOPOLAMINE 1 MG/3D
1 PATCH, EXTENDED RELEASE TRANSDERMAL
Refills: 0 | Status: DISCONTINUED | OUTPATIENT
Start: 2025-01-01 | End: 2025-01-01

## 2025-01-01 RX ADMIN — SCOPOLAMINE 1 PATCH: 1 PATCH, EXTENDED RELEASE TRANSDERMAL at 19:30

## 2025-01-01 RX ADMIN — Medication 25 GRAM(S): at 10:13

## 2025-01-01 RX ADMIN — Medication 2 MG/HR: at 05:50

## 2025-01-01 RX ADMIN — INSULIN LISPRO 4: 100 INJECTION, SOLUTION INTRAVENOUS; SUBCUTANEOUS at 10:28

## 2025-01-01 RX ADMIN — Medication 300 MILLIGRAM(S): at 18:58

## 2025-01-01 RX ADMIN — SODIUM CHLORIDE 80 MILLILITER(S): 9 INJECTION, SOLUTION INTRAVENOUS at 06:19

## 2025-01-01 RX ADMIN — Medication 650 MILLIGRAM(S): at 11:59

## 2025-01-01 RX ADMIN — SCOPOLAMINE 1 PATCH: 1 PATCH, EXTENDED RELEASE TRANSDERMAL at 18:57

## 2025-01-01 RX ADMIN — Medication 4 MILLIGRAM(S): at 05:02

## 2025-01-01 RX ADMIN — Medication 400 MILLIGRAM(S): at 07:43

## 2025-01-01 RX ADMIN — Medication 25 GRAM(S): at 17:12

## 2025-01-01 RX ADMIN — Medication 200 GRAM(S): at 04:57

## 2025-01-01 RX ADMIN — Medication 250 MILLIGRAM(S): at 05:50

## 2025-01-01 RX ADMIN — Medication 1000 MILLILITER(S): at 04:22

## 2025-01-01 RX ADMIN — SODIUM CHLORIDE 250 MILLILITER(S): 9 INJECTION, SOLUTION INTRAVENOUS at 09:06

## 2025-01-01 RX ADMIN — Medication 4 MILLIGRAM(S): at 06:30

## 2025-01-01 RX ADMIN — Medication 650 MILLIGRAM(S): at 10:29

## 2025-01-01 RX ADMIN — SODIUM POLYSTYRENE SULFONATE 15 GRAM(S): 15 SUSPENSION ORAL; RECTAL at 13:14

## 2025-02-17 NOTE — CARE COORDINATION ASSESSMENT. - OTHER PERTINENT REFERRAL INFORMATION
89F PMH HTN, HLD, alzheimers, CHF, PAD admitted from [facility] for tachycardia, tachypnea, hypoxia. Pt is a long term care resident at facility, plan is return when medically cleared. Message left for son with SW contact info.

## 2025-02-17 NOTE — H&P ADULT - ASSESSMENT
89F PMH HTN, HLD, alzheimers, CHF, PAD [facility] for tachycardia, tachypnea, hypoxia that staff noted while they were rounding on her. They gave her 1g IV tylenol  I spoke to pt's son  Dr. Bunny Sainz who is a physician: I spoke over the phone. He confirms pt is DNR/DNI ([place] is in the chart). He does not want pressors, bipap, cardioversions, or aggressive measures. He is ok with IVF, IV abx, medicines if needed for SVT. Pt has seen a vascular surgeon for the necrotic toes and they recommended an AKA but family does not want to pursue that. She has known occlusions in arteries of LLE .As per son Dr Feldman patient has advanced dementia and not able to recognize family , family requests comfort care measures only , palliative care consult , ok to continue iv abx and fluids , no accu-cheks , no labs , no cardiac monitoring /no RRT .Family is aware of very poor prognosis .Morphine was started earlier in ER

## 2025-02-17 NOTE — PATIENT PROFILE ADULT - DEAF OR HARD OF HEARING?
Phoned pt to verify appt location next Friday at [address] has previously been seen at [facility].  Pt states she has no time for appt even though she made appt at [facility]. Only Friday's are convenient, but doesn't want to travel to [place]. Pt unhappy w/
yes

## 2025-02-17 NOTE — PROVIDER CONTACT NOTE (OTHER) - SITUATION
Patient received from ED, unable to obtain blood pressure, febrile; recent tylenol dose given 1g IVPB at 07:42 and no change noted in temperature

## 2025-02-17 NOTE — H&P ADULT - NSICDXPASTMEDICALHX_GEN_ALL_CORE_FT
PAST MEDICAL HISTORY:  Advanced dementia     Alzheimer disease     Anxiety     Glaucoma     History of tachycardia     HLD (hyperlipidemia)     HTN (hypertension)     Insomnia     Lack of coordination     Lumbar compression fracture     MDD (major depressive disorder)     Muscle weakness     Senile psychosis

## 2025-02-17 NOTE — H&P ADULT - TIME BILLING
55minutes spent on this visit, 50% visit time spent in care co-ordination with other attendings and counselling patient ,writing admission orders ( see complete and current orders and order section) ,requesting necessary consults ,informing family about status & plan of care .I have discussed care plan with [facility] wellness/admitting /nursing   department ,outpatient PCP , hospital consultants , ER physician & med staff .

## 2025-02-17 NOTE — CONSULT NOTE ADULT - ASSESSMENT
Pt is a 89F PMH HTN, HLD, alzheimers, CHF, PAD [facility] for tachycardia, tachypnea, hypoxia that staff noted while they were rounding on her. They gave her 1g IV tylenol  I spoke to pt's son   Bunny Sainz who is a physician: I spoke over the phone. He confirms pt is DNR/DNI (Molst is in the chart). He does not want pressors, bipap, cardioversions, or aggressive measures. He is ok with IVF, IV abx, medicines if needed for SVT.  ID c/s for further evaluation     Sepsis 2/2 unclear source  AHRF on NRB  - febrile, leukocytosis to 15  - RVP negative  - CXR clear    Recommendations:   C/w zosyn  UA w/ reflex cx  F/u BCx  CT CAP to evaluate for infectious etiology  O2, supportive measures  Additional care per primary     Patient evaluated with face-to-face time in addition to reviewing history, labs, microbiology, and imaging.   Antibiotic stewardship, local antibiogram, infection control strategies and potential transmission issues taken into consideration at time of treatment decision making process.   Thank you for allowing us to participate in the care of your patient.  Infectious Diseases will follow. Please call with any questions.  Fabiola Sawyer M.D.  Available on [employer] TEAMS -- *[facility]*  [facility] [phone]  For after 5 P.M. and weekends, please call [phone]
   Initial evaluation/Pulmonary Critical Care Consultation requested by Dr Roche  on  2/17/2025 from Dr Carlos Cervantes    Patient examined chart reviewed    HOSPITAL ADMISSION   PATIENT CAME  FROM (if information available)      REASON FOR VISIT  .. Management of problems listed below      EVENTS.        ROS  . Patient unable to give ROS     PHYSICAL EXAM    HEENT Unremarkable  atraumatic   RESP Fair air entry  Harsh breath sound   CARDIAC S1 S2 No S3     NO JVD    ABDOMEN No hepatosplenomegaly   PEDAL EDEMA present No calf tenderness  NO rash       CHIEF COMPLAINT.   . 2/17/2025 BIBA from [facility] in respiratory distress    OVERALL PRESENTATION.  . 89 f from [facility] resp distress  . ER PROGRESS NOTE  . Progress: sons at bedside now: updates given and all quests answered. do not want to place catheter for urine at this time. would like pt to stay on the cardiac monitor. requesting morphine gtt for pain control. order placed. Dr. CHENET núñez; awaiting call back for admission  . VS are slightly improving with measures. Pt is not an ICU candidate at this time due to family not wanting any aggressive measures.  . 2/17/2025 Pt was found to have hi la hi trop hi Na Hi w and was started on zosyn and given fluids and pulm consulted   PMH.      PAST HOSPITAL STAYS .  Home Medications:     ER MGMT .  . 2/17/2025 4a ZOSYN   . 2/17/2025 5a MS 4   . 2/17/2025 5a MS 2m/h   . 2/17/2025 4a NS 1L   . 2/17/2025 5a D5 80       [id number]  VITALS/GAS EXCHANGE/DRIPS    ABGS.     .  VS/ PO/IO/ VENT/ DRIPS.   2/17/2025 139 132/48   2/17/2025 nrb 100%       [id number]  PROBLEM ASSESSMENT RECOMMENDATIONS.  RESP.   INFECTION.  . DATA  .... w 2/17/2025 w 15.6   .... CXR 2/17/2025 clear lungs Deep sulcus on r   .... RVP 2/17/2025 (-)     . SEPSIS   .... 2/17/2025 Pancs   .... 2/17/2025 empiric zosyn     CARDIAC.  . LACTICEMIA    .... la 2/17/2025 LA 7.5   .... Fluids     . MI   .... Trop 2/17/2025 Tr 300  .... 2/17/2025 ASA supp   .  . CHF  .... pbnp 2/17/2025 pbnp 2809   .... check tte     HEMAT.  . DATA  .... Hb 2/17/2025 Hb 13.7   .... Plt 2/17/2025 plt 615   .... inr 2/17/2025 inr 182  .... monitor       RENAL.  . DATA  .... Na 2/17/2025 Na 151   .... K 2/17/2025 K 5.6   .... CO2 2/17/2025 co2 23   .... Cr 2/17/2025 Cr 2   ....  Fluids     ENDO.  NEURO.      DISCUSSIONS.  .... Discussed with primary care and relevant consultants on an ongoing basis     [id number]   SUMMARY  CC.   . 2/17/2025 RESP DISTRESS   MAIN ISSUES.  . SEPSIS 2/17/2025   . LACTICEMIA 2/17/2025 LA 7.5   . MI 2/17/2025 Tr 300   . CHF 2/17/2025 pbnp 2809   . HYPERKALEMIA 2/17/2025 K 5.6   . KITA 2/17/2025 Cr 2   .... 2/17/2025 Family refused chinchilla  . HYPERNATREMIA 2/17/2025 Na 151   . HYPERGLYCEMIA 2/17/2025 G 300      TIME SPENT.  . Over 55  minutes aggregate care time spent on encounter; activities included   direct patient care, counseling and/or coordinating care reviewing notes, lab data/ imaging , discussion with multidisciplinary team/ patient  /family and explaining in detail risks, benefits, alternatives  of the recommendations     [facility] 89 [facility] 9/24/1930  
      ACUTE RENAL FAILURE:   Serum creatinine is  at   2.o  , approximating GFR at   ml/min.   There is no progression . No uremic symptoms  No evidence of anemia .  Fluid status stable.  Will continue to avoid nephrotoxic drugs.  Patient remains asymptomatic.   Continue current therapy.  hold  diuretic.  hold   ACE inhibitor.  hold   ARB.  Additional evaluation:   ECG,    echocardiogram,     CXR,  will obtained recent   renal ultrasound to evalaute kidney size and possible stones ,    hyperkalemia sodium polystyrene sulfonate Enema 15 Gram(s) Rectal once      Admit for septic workup  evaluation,send blood and urine cx,serial lactate levels,monitor vitals closley,ivfs hydration,monitor urine output and renal profile,iv abx as per  cons  piperacillin/tazobactam IVPB.. 3.375 Gram(s) IV Intermittent every 8 hours
acute sob  r/o pna  on zosyn  sinus tachycardia  elevated trop[onin- r/o nstemi ? from renal failure - bun 54 - scr 2.00  on asa supp  r/o sepsis  elevated lactic acid  hypernatremia- na 151  hyperkalemia k5.6  dnr- dni status   prognosis - grave

## 2025-02-17 NOTE — ED PROVIDER NOTE - PROGRESS NOTE DETAILS
RT suctioned large amount of thick mucous from nose and mouth. I spoke to ICU PA who will eval pt in ED, however pt will likely not be in ICU due to family not wanting aggressive measures at this time sons at bedside now: updates given and all quests answered. do not want to place catheter for urine at this time. would like pt to stay on the cardiac monitor. requesting morphine gtt for pain control. order placed. Dr. CHENTE Hanks paged; awaiting call back for admission    VS are slightly improving with measures. Pt is not an ICU candidate at this time due to family not wanting any aggressive measures RT suctioned large amount of thick mucous from nose and mouth pt appears much more comfortable after morphine IVP dose. awaiting call back from Dr. Hanks for admission. sons at bedside updated spoke to Dr. CHENTE Hanks: requesting D5 W at 80cc/hr. He is accepting admission under remote tele for Dr. Roche. sons updated. pt remains on NRB at this time

## 2025-02-17 NOTE — H&P ADULT - HISTORY OF PRESENT ILLNESS
89F PMH HTN, HLD, alzheimers, [facility], tachypnea, hypoxia that staff noted while they were rounding on her. They gave her 1g IV tylenol  I spoke to pt's son   Bunny [place] who is a physician: I spoke over the phone. He confirms pt is DNR/DNI (Molst is in the chart). He does not want pressors, bipap, cardioversions, or aggressive measures. He is ok with IVF, IV abx, medicines if needed for SVT. Pt has seen a vascular surgeon for the necrotic toes and they recommended an AKA but family does not want to pursue that. 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 They gave her 1g IV tylenol  I spoke to pt's son  Dr. Bunny Sainz who is a physician: I spoke over the phone. He confirms pt is DNR/DNI (Molst is in the chart). He does not want pressors, bipap, cardioversions, or aggressive measures. He is ok with IVF, IV abx, medicines if needed for SVT. Pt has seen a vascular surgeon for the necrotic toes and they recommended an AKA but family does not want to pursue that. 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

## 2025-02-17 NOTE — PATIENT PROFILE ADULT - FALL HARM RISK - HARM RISK INTERVENTIONS
Assistance with ambulation/Assistance OOB with selected safe patient handling equipment/Communicate Risk of Fall with Harm to all staff/Discuss with provider need for PT consult/Monitor gait and stability/Reinforce activity limits and safety measures with patient and family/Tailored Fall Risk Interventions/Visual Cue: Yellow wristband and red socks/Bed in lowest position, wheels locked, appropriate side rails in place/Call bell, personal items and telephone in reach/Instruct patient to call for assistance before getting out of bed or chair/Non-slip footwear when patient is out of bed/[place] to call system/Physically safe environment - no spills, clutter or unnecessary equipment/Purposeful Proactive Rounding/Room/bathroom lighting operational, light cord in reach Assistance with ambulation/Assistance OOB with selected safe patient handling equipment/Communicate Risk of Fall with Harm to all staff/Discuss with provider need for PT consult/Monitor gait and stability/Provide patient with walking aids - walker, cane, crutches/Reinforce activity limits and safety measures with patient and family/Tailored Fall Risk Interventions/Visual Cue: Yellow wristband and red socks/Bed in lowest position, wheels locked, appropriate side rails in place/Call bell, personal items and telephone in reach/Instruct patient to call for assistance before getting out of bed or chair/Non-slip footwear when patient is out of bed/[place] to call system/Physically safe environment - no spills, clutter or unnecessary equipment/Purposeful Proactive Rounding/Room/bathroom lighting operational, light cord in reach

## 2025-02-17 NOTE — ED ADULT NURSE NOTE - NS ED NURSE DISCH DISPOSITION
Render In Strict Bullet Format?: No Detail Level: Zone Modify Regimen: Apply Opzelura to affected area twice daily for 6-12 weeks \\nFor flares: Apply Desonide to affected areas twice daily for 4 weeks Admitted

## 2025-02-17 NOTE — CONSULT NOTE ADULT - SUBJECTIVE AND OBJECTIVE BOX
CHIEF COMPLAINT/ REASON FOR VISIT  .. Patient was seen to address the  issue listed under PROBLEM LIST which is located toward bottom of this note     WAYNE DELACRUZ    PLV APER 02    Allergies    No Known Allergies    Intolerances        PAST MEDICAL & SURGICAL HISTORY:      FAMILY HISTORY:      Home Medications:      MEDICATIONS  (STANDING):  dextrose 5%. 1000 milliLiter(s) (80 mL/Hr) IV Continuous <Continuous>  morphine  Infusion 2 mG/Hr (2 mL/Hr) IV Continuous <Continuous>    MEDICATIONS  (PRN):              Vital Signs Last 24 Hrs  T(C): 41.7 (17 Feb 2025 03:53), Max: 41.7 (17 Feb 2025 03:53)  T(F): 107 (17 Feb 2025 03:53), Max: 107 (17 Feb 2025 03:53)  HR: 139 (17 Feb 2025 06:11) (132 - 161)  BP: 132/49 (17 Feb 2025 06:11) (107/62 - 132/49)  BP(mean): --  RR: 26 (17 Feb 2025 06:11) (26 - 30)  SpO2: 99% (17 Feb 2025 06:11) (98% - 99%)    Parameters below as of 17 Feb 2025 06:11  Patient On (Oxygen Delivery Method): mask, nonrebreather                  LABS:                        13.7   15.67 )-----------( 615      ( 17 Feb 2025 03:55 )             44.9     02-17    151[H]  |  117[H]  |  54[H]  ----------------------------<  300[H]  5.6[H]   |  23  |  2.00[H]    Ca    9.1      17 Feb 2025 03:55    TPro  6.9  /  Alb  2.3[L]  /  TBili  0.9  /  DBili  x   /  AST  73[H]  /  ALT  52  /  AlkPhos  81  02-17    PT/INR - ( 17 Feb 2025 03:55 )   PT: 21.2 sec;   INR: 1.82 ratio         PTT - ( 17 Feb 2025 03:55 )  PTT:26.2 sec  Urinalysis Basic - ( 17 Feb 2025 03:55 )    Color: x / Appearance: x / SG: x / pH: x  Gluc: 300 mg/dL / Ketone: x  / Bili: x / Urobili: x   Blood: x / Protein: x / Nitrite: x   Leuk Esterase: x / RBC: x / WBC x   Sq Epi: x / Non Sq Epi: x / Bacteria: x            WBC:  WBC Count: 15.67 K/uL (02-17 @ 03:55)      MICROBIOLOGY:  RECENT CULTURES:              PT/INR - ( 17 Feb 2025 03:55 )   PT: 21.2 sec;   INR: 1.82 ratio         PTT - ( 17 Feb 2025 03:55 )  PTT:26.2 sec    Sodium:  Sodium: 151 mmol/L (02-17 @ 03:55)      2.00 mg/dL 02-17 @ 03:55      Hemoglobin:  Hemoglobin: 13.7 g/dL (02-17 @ 03:55)      Platelets: Platelet Count - Automated: 615 K/uL (02-17 @ 03:55)      LIVER FUNCTIONS - ( 17 Feb 2025 03:55 )  Alb: 2.3 g/dL / Pro: 6.9 g/dL / ALK PHOS: 81 U/L / ALT: 52 U/L / AST: 73 U/L / GGT: x             Urinalysis Basic - ( 17 Feb 2025 03:55 )    Color: x / Appearance: x / SG: x / pH: x  Gluc: 300 mg/dL / Ketone: x  / Bili: x / Urobili: x   Blood: x / Protein: x / Nitrite: x   Leuk Esterase: x / RBC: x / WBC x   Sq Epi: x / Non Sq Epi: x / Bacteria: x        RADIOLOGY & ADDITIONAL STUDIES:      MICROBIOLOGY:  RECENT CULTURES:          
  Patient is a 89y Female whom presented to the hospital with     PAST MEDICAL & SURGICAL HISTORY:      MEDICATIONS  (STANDING):  aspirin Suppository 300 milliGRAM(s) Rectal daily  dextrose 5%. 1000 milliLiter(s) (100 mL/Hr) IV Continuous <Continuous>  dextrose 5%. 1000 milliLiter(s) (50 mL/Hr) IV Continuous <Continuous>  dextrose 5%. 1000 milliLiter(s) (80 mL/Hr) IV Continuous <Continuous>  dextrose 50% Injectable 25 Gram(s) IV Push once  dextrose 50% Injectable 12.5 Gram(s) IV Push once  dextrose 50% Injectable 25 Gram(s) IV Push once  glucagon  Injectable 1 milliGRAM(s) IntraMuscular once  heparin   Injectable 5000 Unit(s) SubCutaneous every 12 hours  insulin lispro (ADMELOG) corrective regimen sliding scale   SubCutaneous every 6 hours  morphine  Infusion 2 mG/Hr (2 mL/Hr) IV Continuous <Continuous>  piperacillin/tazobactam IVPB.. 3.375 Gram(s) IV Intermittent every 8 hours  sodium polystyrene sulfonate Enema 15 Gram(s) Rectal once      Allergies    No Known Allergies    Intolerances        SOCIAL HISTORY:  Denies ETOh,Smoking,     FAMILY HISTORY:      REVIEW OF SYSTEMS:  unable to obtained a good review system      VITAL:  T(C): , Max: 41.7 (02-17-25 @ 03:53)  T(F): , Max: 107 (02-17-25 @ 03:53)  HR: 140 (02-17-25 @ 07:26)  BP: 125/78 (02-17-25 @ 07:26)  BP(mean): --  RR: 30 (02-17-25 @ 07:26)  SpO2: 100% (02-17-25 @ 07:26)  Wt(kg): --    I and O's:      Weight (kg): 51.2 (02-17 @ 04:08)    PHYSICAL EXAM:    Constitutional: res distress on falcial  mask    Neck:  No JVD  Respiratory: decrease bs b/l   Cardiovascular: S1 and S2  Gastrointestinal: BS+, soft, NT/ND  Extremities: No peripheral edema  Access: Not applicable    LABS:                        13.7   15.67 )-----------( 615      ( 17 Feb 2025 03:55 )             44.9     02-17    151[H]  |  117[H]  |  54[H]  ----------------------------<  300[H]  5.6[H]   |  23  |  2.00[H]    Ca    9.1      17 Feb 2025 03:55    TPro  6.9  /  Alb  2.3[L]  /  TBili  0.9  /  DBili  x   /  AST  73[H]  /  ALT  52  /  AlkPhos  81  02-17      Urine Studies:  Urinalysis Basic - ( 17 Feb 2025 03:55 )    Color: x / Appearance: x / SG: x / pH: x  Gluc: 300 mg/dL / Ketone: x  / Bili: x / Urobili: x   Blood: x / Protein: x / Nitrite: x   Leuk Esterase: x / RBC: x / WBC x   Sq Epi: x / Non Sq Epi: x / Bacteria: x            RADIOLOGY & ADDITIONAL STUDIES:      MEDICATIONS  (STANDING):  aspirin Suppository 300 milliGRAM(s) Rectal daily  dextrose 5%. 1000 milliLiter(s) (100 mL/Hr) IV Continuous <Continuous>  dextrose 5%. 1000 milliLiter(s) (50 mL/Hr) IV Continuous <Continuous>  dextrose 5%. 1000 milliLiter(s) (80 mL/Hr) IV Continuous <Continuous>  dextrose 50% Injectable 25 Gram(s) IV Push once  dextrose 50% Injectable 12.5 Gram(s) IV Push once  dextrose 50% Injectable 25 Gram(s) IV Push once  glucagon  Injectable 1 milliGRAM(s) IntraMuscular once  heparin   Injectable 5000 Unit(s) SubCutaneous every 12 hours  insulin lispro (ADMELOG) corrective regimen sliding scale   SubCutaneous every 6 hours  morphine  Infusion 2 mG/Hr (2 mL/Hr) IV Continuous <Continuous>  piperacillin/tazobactam IVPB.. 3.375 Gram(s) IV Intermittent every 8 hours  sodium polystyrene sulfonate Enema 15 Gram(s) Rectal once            
[facility]  VEE Zaidi S. Shah, Y. Patel, G. [facility]  [phone]    WAYNE DELACRUZ  89y, Female  [id number]    HPI--  HPI:  89F PMH HTN, HLD, alzheimers, CHF, PAD [facility] for tachycardia, tachypnea, hypoxia that staff noted while they were rounding on her. They gave her 1g IV tylenol  I spoke to pt's son   Bunny Montana who is a physician: I spoke over the phone. He confirms pt is DNR/DNI (Molst is in the chart). He does not want pressors, bipap, cardioversions, or aggressive measures. He is ok with IVF, IV abx, medicines if needed for SVT. Pt has seen a vascular surgeon for the necrotic toes and they recommended an AKA but family does not want to pursue that. 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 650 milliGRAM(s) Rectal every 6 hours PRN  aspirin Suppository 300 milliGRAM(s) Rectal daily  bisacodyl Suppository 10 milliGRAM(s) Rectal daily PRN  dextrose 5%. 1000 milliLiter(s) IV Continuous <Continuous>  dextrose 5%. 1000 milliLiter(s) IV Continuous <Continuous>  dextrose 5%. 1000 milliLiter(s) IV Continuous <Continuous>  dextrose 50% Injectable 25 Gram(s) IV Push once  dextrose 50% Injectable 12.5 Gram(s) IV Push once  dextrose 50% Injectable 25 Gram(s) IV Push once  dextrose Oral Gel 15 Gram(s) Oral once PRN  glucagon  Injectable 1 milliGRAM(s) IntraMuscular once  morphine  Infusion 2 mG/Hr IV Continuous <Continuous>  piperacillin/tazobactam IVPB.. 3.375 Gram(s) IV Intermittent every 8 hours    Antimicrobials:   piperacillin/tazobactam IVPB.. 3.375 Gram(s) IV Intermittent every 8 hours    Immunologic:     ROS:  unable to obtain    Allergies: No Known Allergies    PMH --   PSH --   FH --   Social History --  EtOH: denies   Tobacco: denies   Drug Use: denies     Travel/Environmental/Occupational History:    Physical Exam--  Vital Signs Last 24 Hrs  T(F): 103 (17 Feb 2025 09:54), Max: 107 (17 Feb 2025 03:53)  HR: 132 (17 Feb 2025 09:54) (132 - 161)  BP: 125/78 (17 Feb 2025 07:26) (107/62 - 132/49)  RR: 29 (17 Feb 2025 09:54) (26 - 30)  SpO2: 96% (17 Feb 2025 09:54) (96% - 100%)  General: elderly W  HEENT: NC/AT, EOMI  Lungs: on NRB  Heart: RRR  Abdomen: Soft. Nondistended. Nontender.   Extremities: No cyanosis or clubbing. No edema.   Skin: Warm. Dry. Good turgor. No rash.     Laboratory & Imaging Data:  CBC:                       13.7   15.67 )-----------( 615      ( 17 Feb 2025 03:55 )             44.9     CMP: 02-17    151[H]  |  117[H]  |  54[H]  ----------------------------<  300[H]  5.6[H]   |  23  |  2.00[H]    Ca    9.1      17 Feb 2025 03:55    TPro  6.9  /  Alb  2.3[L]  /  TBili  0.9  /  DBili  x   /  AST  73[H]  /  ALT  52  /  AlkPhos  81  02-17    LIVER FUNCTIONS - ( 17 Feb 2025 03:55 )  Alb: 2.3 g/dL / Pro: 6.9 g/dL / ALK PHOS: 81 U/L / ALT: 52 U/L / AST: 73 U/L / GGT: x           Urinalysis Basic - ( 17 Feb 2025 03:55 )    Color: x / Appearance: x / SG: x / pH: x  Gluc: 300 mg/dL / Ketone: x  / Bili: x / Urobili: x   Blood: x / Protein: x / Nitrite: x   Leuk Esterase: x / RBC: x / WBC x   Sq Epi: x / Non Sq Epi: x / Bacteria: x        Microbiology: reviewed        Radiology: reviewed    < from: Xray Chest 1 View- PORTABLE-Urgent (Xray Chest 1 View- PORTABLE-Urgent .) (02.17.25 @ 04:02) >    ACC: [id number] EXAM:  XR CHEST PORTABLE URGENT 1V   ORDERED BY: ASPEN PAREDES     PROCEDURE DATE:  02/17/2025          INTERPRETATION:  Clinical History / Reason for exam: sob    Comparison : Chest radiograph: None    Technique/Positioning: Frontal view of the chest    Findings:    Support devices: None.    Cardiac/mediastinum/hilum: Partially rotated and not well evaluated    Skeleton/soft tissues: No evidence of acute osseous abnormality.    Lung parenchyma/Pleura: There is no evidence of focal consolidation,   pleural effusion or pneumothorax.    Impression:  No evidence of focal consolidation, pleural effusion or pneumothorax.            --- End of Report ---            PARISA MARCANO MD; Attending Radiologist  This document has been electronically signed. Feb 17 2025  1:39PM    < end of copied text >  
CARDIOLOGY CONSULT NOTE    Patient is a 89y Female with a known history of : admitted with acute sob    HPI:      REVIEW OF SYSTEMS:    CONSTITUTIONAL: No fever, weight loss, or fatigue  EYES: No eye pain, visual disturbances, or discharge  ENMT:  No difficulty hearing, tinnitus, vertigo; No sinus or throat pain  NECK: No pain or stiffness  BREASTS: No pain, masses, or nipple discharge  RESPIRATORY: No cough, wheezing, chills or hemoptysis; No shortness of breath  CARDIOVASCULAR: No chest pain, palpitations, dizziness, or leg swelling  GASTROINTESTINAL: No abdominal or epigastric pain. No nausea, vomiting, or hematemesis; No diarrhea or constipation. No melena or hematochezia.  GENITOURINARY: No dysuria, frequency, hematuria, or incontinence  NEUROLOGICAL: No headaches, memory loss, loss of strength, numbness, or tremors  SKIN: No itching, burning, rashes, or lesions   LYMPH NODES: No enlarged glands  ENDOCRINE: No heat or cold intolerance; No hair loss  MUSCULOSKELETAL: No joint pain or swelling; No muscle, back, or extremity pain  PSYCHIATRIC: No depression, anxiety, mood swings, or difficulty sleeping  HEME/LYMPH: No easy bruising, or bleeding gums  ALLERGY AND IMMUNOLOGIC: No hives or eczema    MEDICATIONS  (STANDING):  aspirin Suppository 300 milliGRAM(s) Rectal daily  dextrose 5%. 1000 milliLiter(s) (100 mL/Hr) IV Continuous <Continuous>  dextrose 5%. 1000 milliLiter(s) (50 mL/Hr) IV Continuous <Continuous>  dextrose 5%. 1000 milliLiter(s) (80 mL/Hr) IV Continuous <Continuous>  dextrose 50% Injectable 25 Gram(s) IV Push once  dextrose 50% Injectable 12.5 Gram(s) IV Push once  dextrose 50% Injectable 25 Gram(s) IV Push once  glucagon  Injectable 1 milliGRAM(s) IntraMuscular once  heparin   Injectable 5000 Unit(s) SubCutaneous every 12 hours  insulin lispro (ADMELOG) corrective regimen sliding scale   SubCutaneous every 6 hours  morphine  Infusion 2 mG/Hr (2 mL/Hr) IV Continuous <Continuous>  piperacillin/tazobactam IVPB.. 3.375 Gram(s) IV Intermittent every 8 hours  sodium polystyrene sulfonate Enema 15 Gram(s) Rectal once    MEDICATIONS  (PRN):  acetaminophen  Suppository .. 650 milliGRAM(s) Rectal every 6 hours PRN Temp greater or equal to 38C (100.4F), Mild Pain (1 - 3)  dextrose Oral Gel 15 Gram(s) Oral once PRN Blood Glucose LESS THAN 70 milliGRAM(s)/deciliter      ALLERGIES: No Known Allergies      Social History:     FAMILY HISTORY:      PAST MEDICAL & SURGICAL HISTORY:        PHYSICAL EXAMINATION:  -----------------------------  T(C): 38.9 (02-17-25 @ 07:26), Max: 41.7 (02-17-25 @ 03:53)  HR: 140 (02-17-25 @ 07:26) (132 - 161)  BP: 125/78 (02-17-25 @ 07:26) (107/62 - 132/49)  RR: 30 (02-17-25 @ 07:26) (26 - 30)  SpO2: 100% (02-17-25 @ 07:26) (98% - 100%)  Wt(kg): --      Weight (kg): 51.2 (02-17 @ 04:08)    Constitutional: well developed, normal appearance, well groomed, well nourished, no deformities and no acute distress.   Eyes: the conjunctiva exhibited no abnormalities and the eyelids demonstrated no xanthelasmas.   HEENT: normal oral mucosa, no oral pallor and no oral cyanosis.   Neck: normal jugular venous A waves present, normal jugular venous V waves present and no jugular venous grove A waves.   Pulmonary: no respiratory distress, normal respiratory rhythm and effort, no accessory muscle use and lungs were clear to auscultation bilaterally.   Cardiovascular: heart rate and rhythm were normal, normal S1 and S2 and no murmur, gallop, rub, heave or thrill are present.   Abdomen: soft, non-tender, no hepato-splenomegaly and no abdominal mass palpated.   Musculoskeletal: the gait could not be assessed..   Extremities: no clubbing of the fingernails, no localized cyanosis, no petechial hemorrhages and no ischemic changes.   Skin: normal skin color and pigmentation, no rash, no venous stasis, no skin lesions, no skin ulcer and no xanthoma was observed.   Psychiatric: oriented to person, place, and time, the affect was normal, the mood was normal and not feeling anxious.     LABS:   --------  02-17    151[H]  |  117[H]  |  54[H]  ----------------------------<  300[H]  5.6[H]   |  23  |  2.00[H]    Ca    9.1      17 Feb 2025 03:55    TPro  6.9  /  Alb  2.3[L]  /  TBili  0.9  /  DBili  x   /  AST  73[H]  /  ALT  52  /  AlkPhos  81  02-17                         13.7   15.67 )-----------( 615      ( 17 Feb 2025 03:55 )             44.9     PT/INR - ( 17 Feb 2025 03:55 )   PT: 21.2 sec;   INR: 1.82 ratio         PTT - ( 17 Feb 2025 03:55 )  PTT:26.2 sec            RADIOLOGY:  -----------------        ECG:     ECHO

## 2025-02-17 NOTE — ED PROVIDER NOTE - CARE PLAN
Principal Discharge DX:	Sepsis with acute hypoxic respiratory failure   1 Principal Discharge DX:	Sepsis with acute hypoxic respiratory failure  Secondary Diagnosis:	Hypernatremia

## 2025-02-17 NOTE — ED PROVIDER NOTE - CLINICAL SUMMARY MEDICAL DECISION MAKING FREE TEXT BOX
89F PMH HTN, HLD, alzheimers, [facility], [facility] hypoxia that staff noted while they were rounding on her. They gave her 1g IV tylenol    I spoke to pt's son Bill who was updated and will be coming to the ED. He requested I speak to his brother Dr. Bunny Sainz who is a physician: I spoke over the phone. He confirms pt is DNR/DNI (Molst is in the chart). He does not want pressors, bipap, cardioversions, or aggressive measures. He is ok with IVF, IV abx, medicines if needed for SVT. Pt has seen a vascular surgeon for the necrotic toes and they recommended an AKA but family does not want to pursue that. 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

## 2025-02-17 NOTE — ED ADULT NURSE NOTE - OBJECTIVE STATEMENT
Pt bibems from [facility] for respiratory distress. Per EMS, pt's O2 sat was in low 80's in RA and arrived with NRB with O2 sat of 100%. Pt is alert but unable to answer questions. Pt was immediately evaluated by the attending physician and sepsis work up was initiated. Rectal temp of 107 obtained. Pt arrived with NS bolus and IV Tylenol. 2nd IV line placed and labs drawn as ordered. Pt was placed on continuous cardiac, O2 sat and BP monitoring. Necrotic toes noted on the LLE. Plan of care ongoing.

## 2025-02-17 NOTE — H&P ADULT - PROBLEM SELECTOR PLAN 3
Likely 2/2 to infected necrotic toes  , no evidence of pna on chest xray , uti is suspected as well - patient is on iv abx , seen by ID

## 2025-02-17 NOTE — CARE COORDINATION ASSESSMENT. - NSCAREPROVIDERS_GEN_ALL_CORE_FT
CARE PROVIDERS:  Accepting Physician: Ashley Roche  Access Services: Marilee Avalos  Administration: Jennifer Perry  Administration: Aurea White  Administration: Julia Arredondo  Administration: Joslyn Hedrick  Administration: Franko Pinto  Administration: Maddie Moraes  Admitting: Ashley Roche  Attending: Ashley Roche  Consultant: Carlos Cervantes  Consultant: Weil, Patricia  Consultant: Destiney Shrestha  Consultant: Bria Parra  Consultant: Fabiola Sawyer ED Attending: Anival Schultz ED Nurse: Jesus Thakkar  Infection Control: Clau James  Nurse: Jesus Thakkar  Nurse: Etelvina Barry  Nurse: Betty Durant  Nurse: Jan Stover  Nurse: Evan Fuentes  Ordered: Doctor, Unknown  Override: Osiris Garcia  Override: Chey Stewart  Override: Evan Fuentes  Override: Elaine Caballero  PCA/Nursing Assistant: Guerrero Carter  Primary Team: Pahlavan, Mohsen  Respiratory Therapy: Christopher Owens  : Jammie Chawla  Team: [facility] Palliative Care, Team  UR// Supp. Assoc.: Yolette Thornton  UR// Supp. Assoc.: Osiris Garcia  UR// Supp. Assoc.: Kaitlin Argueta  UR// Supp. Assoc.: Efren Cuadra  UR// Supp. Assoc.: Vikki Toney

## 2025-02-17 NOTE — ED ADULT NURSE REASSESSMENT NOTE - NS ED NURSE REASSESS COMMENT FT1
Morphine drip started per orders at 2mg/hr. Family at bedside and informed>verbalized understanding. V/s monitored prior to infusion> see flow sheet

## 2025-02-17 NOTE — CONSULT NOTE ADULT - NSCONSULTADDITIONALINFOA_GEN_ALL_CORE
MEDICATIONS  (STANDING):  aspirin Suppository 300 milliGRAM(s) Rectal daily  dextrose 5%. 1000 milliLiter(s) (100 mL/Hr) IV Continuous <Continuous>  dextrose 5%. 1000 milliLiter(s) (50 mL/Hr) IV Continuous <Continuous>  dextrose 5%. 1000 milliLiter(s) (80 mL/Hr) IV Continuous <Continuous>  dextrose 50% Injectable 25 Gram(s) IV Push once  dextrose 50% Injectable 12.5 Gram(s) IV Push once  dextrose 50% Injectable 25 Gram(s) IV Push once  glucagon  Injectable 1 milliGRAM(s) IntraMuscular once  heparin   Injectable 5000 Unit(s) SubCutaneous every 12 hours  insulin lispro (ADMELOG) corrective regimen sliding scale   SubCutaneous every 6 hours  morphine  Infusion 2 mG/Hr (2 mL/Hr) IV Continuous <Continuous>  piperacillin/tazobactam IVPB.. 3.375 Gram(s) IV Intermittent every 8 hours  sodium polystyrene sulfonate Enema 15 Gram(s) Rectal once

## 2025-02-18 NOTE — CHART NOTE - NSCHARTNOTEFT_GEN_A_CORE
DEATH NOTE    Called to bedside to evaluate the patient for unresponsiveness.    On physical exam, patient did not respond to verbal or noxious stimuli.  No spontaneous respirations.  Absent heart and breath sounds.  Absent radial and carotid pulses.   Pupils are fixed and dilated, no corneal reflex. Patient pronounced dead at 0017. Attending notified.  Family ____ autopsy. DEATH NOTE    Called to bedside to evaluate the patient for unresponsiveness.    On physical exam, patient did not respond to verbal or noxious stimuli.  No spontaneous respirations.  Absent heart and breath sounds.  Absent radial and carotid pulses.   Pupils are fixed and dilated, no corneal reflex. Patient pronounced dead at 0017. Attending notified.  Family declined autopsy.    Family notified.

## 2025-02-20 LAB
-  CLINDAMYCIN: SIGNIFICANT CHANGE UP
-  DAPTOMYCIN: SIGNIFICANT CHANGE UP
-  ERYTHROMYCIN: SIGNIFICANT CHANGE UP
-  GENTAMICIN: SIGNIFICANT CHANGE UP
-  LINEZOLID: SIGNIFICANT CHANGE UP
-  OXACILLIN: SIGNIFICANT CHANGE UP
-  PENICILLIN: SIGNIFICANT CHANGE UP
-  RIFAMPIN: SIGNIFICANT CHANGE UP
-  TETRACYCLINE: SIGNIFICANT CHANGE UP
-  TRIMETHOPRIM/SULFAMETHOXAZOLE: SIGNIFICANT CHANGE UP
-  VANCOMYCIN: SIGNIFICANT CHANGE UP
CULTURE RESULTS: ABNORMAL
METHOD TYPE: SIGNIFICANT CHANGE UP
ORGANISM # SPEC MICROSCOPIC CNT: ABNORMAL
ORGANISM # SPEC MICROSCOPIC CNT: ABNORMAL
ORGANISM # SPEC MICROSCOPIC CNT: SIGNIFICANT CHANGE UP
SPECIMEN SOURCE: SIGNIFICANT CHANGE UP

## 2025-02-22 LAB
CULTURE RESULTS: SIGNIFICANT CHANGE UP
SPECIMEN SOURCE: SIGNIFICANT CHANGE UP

## 2025-04-16 NOTE — ED PROVIDER NOTE - CRITICAL CARE ATTENDING CONTRIBUTION TO CARE
Attending Only I have personally provided the amount of critical care time documented below, excluding time spent on separate procedures.